# Patient Record
Sex: MALE | NOT HISPANIC OR LATINO | Employment: OTHER | URBAN - METROPOLITAN AREA
[De-identification: names, ages, dates, MRNs, and addresses within clinical notes are randomized per-mention and may not be internally consistent; named-entity substitution may affect disease eponyms.]

---

## 2022-09-18 ENCOUNTER — HOSPITAL ENCOUNTER (EMERGENCY)
Facility: HOSPITAL | Age: 27
Discharge: HOME | End: 2022-09-18
Attending: EMERGENCY MEDICINE

## 2022-09-18 ENCOUNTER — APPOINTMENT (OUTPATIENT)
Dept: RADIOLOGY | Facility: HOSPITAL | Age: 27
End: 2022-09-18

## 2022-09-18 VITALS
SYSTOLIC BLOOD PRESSURE: 151 MMHG | RESPIRATION RATE: 17 BRPM | OXYGEN SATURATION: 99 % | HEART RATE: 75 BPM | TEMPERATURE: 97.6 F | DIASTOLIC BLOOD PRESSURE: 96 MMHG
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
## 2022-09-18 PROCEDURE — 73630 X-RAY EXAM OF FOOT: CPT

## 2022-09-18 PROCEDURE — 10120 INC&RMVL FB SUBQ TISS SMPL: CPT | Performed by: STUDENT IN AN ORGANIZED HEALTH CARE EDUCATION/TRAINING PROGRAM

## 2022-09-18 PROCEDURE — 96365 THER/PROPH/DIAG IV INF INIT: CPT

## 2022-09-18 PROCEDURE — 96375 TX/PRO/DX INJ NEW DRUG ADDON: CPT

## 2022-09-18 PROCEDURE — 99285 EMERGENCY DEPT VISIT HI MDM: CPT | Performed by: EMERGENCY MEDICINE

## 2022-09-18 PROCEDURE — 73650 X-RAY EXAM OF HEEL: CPT

## 2022-09-18 PROCEDURE — 99283 EMERGENCY DEPT VISIT LOW MDM: CPT

## 2022-09-18 PROCEDURE — 99244 OFF/OP CNSLTJ NEW/EST MOD 40: CPT | Performed by: STUDENT IN AN ORGANIZED HEALTH CARE EDUCATION/TRAINING PROGRAM

## 2022-09-18 RX ORDER — CEFAZOLIN SODIUM 2 G/50ML
2000 SOLUTION INTRAVENOUS ONCE
Status: COMPLETED | OUTPATIENT
Start: 2022-09-18 | End: 2022-09-18

## 2022-09-18 RX ORDER — OXYCODONE HYDROCHLORIDE 5 MG/1
5 TABLET ORAL EVERY 6 HOURS PRN
Qty: 12 TABLET | Refills: 0 | Status: SHIPPED | OUTPATIENT
Start: 2022-09-18 | End: 2022-09-21

## 2022-09-18 RX ORDER — HYDROMORPHONE HCL/PF 1 MG/ML
1 SYRINGE (ML) INJECTION ONCE
Status: COMPLETED | OUTPATIENT
Start: 2022-09-18 | End: 2022-09-18

## 2022-09-18 RX ORDER — CEPHALEXIN 500 MG/1
500 CAPSULE ORAL EVERY 8 HOURS SCHEDULED
Qty: 21 CAPSULE | Refills: 0 | Status: SHIPPED | OUTPATIENT
Start: 2022-09-18 | End: 2022-09-25

## 2022-09-18 RX ORDER — LIDOCAINE HYDROCHLORIDE 10 MG/ML
10 INJECTION, SOLUTION EPIDURAL; INFILTRATION; INTRACAUDAL; PERINEURAL ONCE
Status: COMPLETED | OUTPATIENT
Start: 2022-09-18 | End: 2022-09-18

## 2022-09-18 RX ADMIN — LIDOCAINE HYDROCHLORIDE 10 ML: 10 INJECTION, SOLUTION EPIDURAL; INFILTRATION; INTRACAUDAL at 14:30

## 2022-09-18 RX ADMIN — HYDROMORPHONE HYDROCHLORIDE 1 MG: 1 INJECTION, SOLUTION INTRAMUSCULAR; INTRAVENOUS; SUBCUTANEOUS at 14:31

## 2022-09-18 RX ADMIN — CEFAZOLIN SODIUM 2000 MG: 2 SOLUTION INTRAVENOUS at 13:52

## 2022-09-18 NOTE — CONSULTS
Consult - Podiatry   Sebastian Coe 31 y o  male MRN: [id number]  Unit/Bed#: ED 28 Encounter: [id number]    Assessment/Plan     Assessment:  1  Foreign body in right foot  2  Right foot pain    Plan:  - Right foot xrays reviewed: foreign body "nail" was found to be in the calcaneous, entry point just lateral to the AT tendon    - Procedure: The foot was cleaned with copious amount of betadine mixed with saline  A local block was performed with injection 5cc of 1% lidocaine plain surrounding the foreign body  Next, pliers were utilized to remove the nail from the foot  The nail was removed successfully  The entry site was copiously irrigated with normal saline mixed with betadine  The site/skin was remodeled to fresh bleeding edges and were re-approximated using a 4-0 Nylon  This was covered with DSD    - Right foot xrays reviewed of post FB removal, there is two small thread of metal wire in the foot, one in the bone an other superiorly on the calc  Discussed with pt at this time will continue to monitor the healing of the incision, if there is any sings of infection or pain we will consider removing the FB at that time    - Received 2g of IV ancef one dose in ER, recommend PO Keflex for 7 days   - Tetanus upto date as per pt  -  WBAT in cam boot   - Follow up my office in 2 weeks, for suture removal   - patient is stable from podiatric standpoint for d/c        History of Present Illness     HPI:  Sebastian Coe is a 31 y o  male who presents after accidentally discharging a nail gun into his Right foot today around 11:30am  The nail went from hist shoes into this body  States he has no pain or gait abnormalities  Tetanus last updated within last 1 year  Denies any other issues or pain at this time  Consults  Review of Systems   Constitutional: Negative  HENT: Negative  Eyes: Negative  Respiratory: Negative  Cardiovascular: Negative  Gastrointestinal: Negative      Musculoskeletal: Right foot pain     Skin:RIght foot with foreign body    Neurological: Negative  Psych: negative  Historical Information   History reviewed  No pertinent past medical history  History reviewed  No pertinent surgical history  Social History   Social History     Substance and Sexual Activity   Alcohol Use Yes     Social History     Substance and Sexual Activity   Drug Use Not Currently     Social History     Tobacco Use   Smoking Status Never Smoker   Smokeless Tobacco Never Used     Family History: History reviewed  No pertinent family history  Meds/Allergies   (Not in a hospital admission)    No Known Allergies    Objective   First Vitals:   Blood Pressure: 151/96 (09/18/22 1257)  Pulse: 75 (09/18/22 1257)  Respirations: 17 (09/18/22 1257)  SpO2: 99 % (09/18/22 1257)    Current Vitals:   Blood Pressure: 151/96 (09/18/22 1257)  Pulse: 75 (09/18/22 1257)  Respirations: 17 (09/18/22 1257)  SpO2: 99 % (09/18/22 1257)        /96   Pulse 75   Resp 17   SpO2 99%      General Appearance:    Alert, cooperative, no distress   Head:    Normocephalic, without obvious abnormality, atraumatic   Eyes:    PERRL, conjunctiva/corneas clear, EOM's intact        Nose:   Moist mucous membranes   Neck:   Supple, symmetrical, trachea midline   Back:     Symmetric   Lungs:     Respirations unlabored   Heart:    Regular rate and rhythm, S1 and S2 normal, no murmur, rub   or gallop   Abdomen:     Soft, non-tender   Extremities:   Right foot and ankle ROM intact  Achilles tendon function intact, AT tendon is not involved with nail insertion into the bone  Pulses:   Palpable pedal pulses DP and PT right foot  Skin:   Right lateral foot with nail foreign body just lateral to the AT tendon  No bleeding noted  Mild edema  Neurologic:   Gross sensation is intact  Protective sensation is intact  Lab Results:   No results found for any previous visit  Invalid input(s): LABAEARO            Imaging:  I have personally reviewed pertinent films in PACS  EKG, Pathology, and Other Studies: I have personally reviewed pertinent reports        Code Status: No Order  Advance Directive and Living Will:      Power of :    POLST: yes

## 2022-09-18 NOTE — ED PROVIDER NOTES
History  Chief Complaint   Patient presents with   [address] [place] [address] Injury     Patient reports inuring right foot with nail gun     26-year-old male presenting to the ed after accidentally discharging a nail gun into his R foot  States he has no pain  Tetanus last updated within last 1 year  Denies any other issues or pain at this time  None       History reviewed  No pertinent past medical history  History reviewed  No pertinent surgical history  History reviewed  No pertinent family history  I have reviewed and agree with the history as documented  E-Cigarette/Vaping     E-Cigarette/Vaping Substances     Social History     Tobacco Use    Smoking status: Never Smoker    Smokeless tobacco: Never Used   Substance Use Topics    Alcohol use: Yes    Drug use: Not Currently       Review of Systems   Skin: Positive for wound (nail in R foot)  All other systems reviewed and are negative  Physical Exam  Physical Exam  Vitals and nursing note reviewed  Constitutional:       General: He is not in acute distress  Appearance: He is well-developed  He is not diaphoretic  HENT:      Head: Normocephalic and atraumatic  Right Ear: External ear normal       Left Ear: External ear normal       Nose: Nose normal    Eyes:      General: No scleral icterus  Right eye: No discharge  Left eye: No discharge  Conjunctiva/sclera: Conjunctivae normal    Cardiovascular:      Rate and Rhythm: Normal rate and regular rhythm  Heart sounds: Normal heart sounds  No murmur heard  No friction rub  No gallop  Pulmonary:      Effort: Pulmonary effort is normal  No respiratory distress  Breath sounds: Normal breath sounds  No wheezing or rales  Abdominal:      General: Bowel sounds are normal  There is no distension  Palpations: Abdomen is soft  There is no mass  Tenderness: There is no abdominal tenderness  There is no guarding     Musculoskeletal:         General: No tenderness or deformity  Normal range of motion  Cervical back: Normal range of motion and neck supple  Feet:    Feet:      Comments: DP and PT pulses 2+ B/L   Sensation equal and intact in B/L feet  Able to wiggle toes without issue  Phan test normal and achilles appears palpably intact  Skin:     General: Skin is warm and dry  Coloration: Skin is not pale  Findings: No erythema or rash  Neurological:      General: No focal deficit present  Mental Status: He is alert and oriented to person, place, and time  Mental status is at baseline  Psychiatric:         Behavior: Behavior normal          Thought Content: Thought content normal          Judgment: Judgment normal          Vital Signs  ED Triage Vitals   Temperature Pulse Respirations Blood Pressure SpO2   09/18/22 1551 09/18/22 1257 09/18/22 1257 09/18/22 1257 09/18/22 1257   97 6 °F (36 4 °C) 75 17 151/96 99 %      Temp Source Heart Rate Source Patient Position - Orthostatic VS BP Location FiO2 (%)   09/18/22 1551 09/18/22 1257 09/18/22 1257 -- --   Oral Monitor Sitting        Pain Score       09/18/22 1257       No Pain           Vitals:    09/18/22 1257   BP: 151/96   Pulse: 75   Patient Position - Orthostatic VS: Sitting         Visual Acuity      ED Medications  Medications   ceFAZolin (ANCEF) IVPB (premix in dextrose) 2,000 mg 50 mL (0 mg Intravenous Stopped 9/18/22 1430)   lidocaine (PF) (XYLOCAINE-MPF) 1 % injection 10 mL (10 mL Infiltration Given by Other 9/18/22 1430)   HYDROmorphone (DILAUDID) injection 1 mg (1 mg Intravenous Given 9/18/22 1431)       Diagnostic Studies  Results Reviewed     None                 XR foot 3+ views RIGHT    (Results Pending)   XR heel / calcaneus 2+ vw right    (Results Pending)              Procedures  Procedures         ED Course  ED Course as of 09/18/22 1755   Sun Sep 18, 2022   1316 Fracture contacted and will come evaluate patient as now noted to be in the calcaneus     C/ [address] Podiatry can not bedside remove nail  Patient and patient's friend or aware that there are remnants left behind the will be unable to be removed this time  Discussed antibiotics and pain control along with weight-bearing as tolerated with the cam walker and the need for follow-up with Podiatry  They state their understanding of this  SBIRT 20yo+    Flowsheet Row Most Recent Value   SBIRT (23 yo +)    In order to provide better care to our patients, we are screening all of our patients for alcohol and drug use  Would it be okay to ask you these screening questions? Yes Filed at: 09/18/2022 1304   Initial Alcohol Screen: US AUDIT-C     1  How often do you have a drink containing alcohol? 2 Filed at: 09/18/2022 1304   2  How many drinks containing alcohol do you have on a typical day you are drinking? 0 Filed at: 09/18/2022 1304   3a  Male UNDER 65: How often do you have five or more drinks on one occasion? 0 Filed at: 09/18/2022 1304   3b  FEMALE Any Age, or MALE 65+: How often do you have 4 or more drinks on one occassion? 0 Filed at: 09/18/2022 1304   Audit-C Score 2 Filed at: 09/18/2022 1304   LADARIUS: How many times in the past year have you    Used an illegal drug or used a prescription medication for non-medical reasons? Never Filed at: 09/18/2022 1304                    MDM  Number of Diagnoses or Management Options  Injury by nail gun, initial encounter  Diagnosis management comments: Patient with accidental nail gun discharged to the right foot  Achilles appears palpably intact and Phan test normal   Patient with no pain on exam   Tetanus up-to-date approximately 1 year ago  Discussed case with Podiatry as nail radiographically noted to be in the calcaneus   IV ancef        Disposition  Final diagnoses:   Injury by nail gun, initial encounter     Time reflects when diagnosis was documented in both MDM as applicable and the Disposition within this note     Time User Action Codes Description Comment    9/18/2022  1:25 PM Jose Moseley [I00  4XXA] Injury by nail gun, initial encounter       ED Disposition     ED Disposition   Discharge    Condition   Stable    Date/Time   Sun Sep 18, 2022  3:18 PM    Comment   Bismark Adler discharge to home/self care  Follow-up Information     Follow up With Specialties Details Why Contact Info Additional Information    Dmitriy House DPM Podiatry Follow up in 2 week(s) Dr Brooke Salazar [address]  [address] Emergency Department Emergency Medicine Go to  As needed, If symptoms worsen [address] Ashkan Newsome [phone] [facility] Emergency Department, [address] [facility], Yamilet laguerre, [address] [facility]          Discharge Medication List as of 9/18/2022  3:23 PM      START taking these medications    Details   cephalexin (KEFLEX) 500 mg capsule Take 1 capsule (500 mg total) by mouth every 8 (eight) hours for 7 days, Starting Sun 9/18/2022, Until Sun 9/25/2022, Normal      oxyCODONE (ROXICODONE) 5 immediate release tablet Take 1 tablet (5 mg total) by mouth every 6 (six) hours as needed for moderate pain for up to 3 days Max Daily Amount: 20 mg, Starting Sun 9/18/2022, Until Wed 9/21/2022 at 2359, Normal             No discharge procedures on file      PDMP Review     None          ED Provider  Electronically Signed by           Rich Wilson DO  09/18/22 8131